# Patient Record
Sex: MALE | Race: WHITE | NOT HISPANIC OR LATINO | URBAN - METROPOLITAN AREA
[De-identification: names, ages, dates, MRNs, and addresses within clinical notes are randomized per-mention and may not be internally consistent; named-entity substitution may affect disease eponyms.]

---

## 2017-10-03 ENCOUNTER — EMERGENCY (EMERGENCY)
Facility: HOSPITAL | Age: 27
LOS: 0 days | Discharge: HOME | End: 2017-10-03
Admitting: GENERAL PRACTICE

## 2017-10-03 DIAGNOSIS — Z87.891 PERSONAL HISTORY OF NICOTINE DEPENDENCE: ICD-10-CM
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
## 2017-10-03 DIAGNOSIS — F17.200 NICOTINE DEPENDENCE, UNSPECIFIED, UNCOMPLICATED: ICD-10-CM

## 2017-10-03 DIAGNOSIS — Z04.1 ENCOUNTER FOR EXAMINATION AND OBSERVATION FOLLOWING TRANSPORT ACCIDENT: ICD-10-CM
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
## 2018-07-09 ENCOUNTER — EMERGENCY (EMERGENCY)
Facility: HOSPITAL | Age: 28
LOS: 0 days | Discharge: HOME | End: 2018-07-09
Attending: EMERGENCY MEDICINE | Admitting: EMERGENCY MEDICINE

## 2018-07-09 VITALS
SYSTOLIC BLOOD PRESSURE: 136 MMHG | RESPIRATION RATE: 20 BRPM | TEMPERATURE: 96 F | DIASTOLIC BLOOD PRESSURE: 85 MMHG | OXYGEN SATURATION: 100 % | HEART RATE: 87 BPM | WEIGHT: 149.91 LBS | HEIGHT: 68 IN

## 2018-07-09 VITALS
HEART RATE: 88 BPM | OXYGEN SATURATION: 99 % | RESPIRATION RATE: 16 BRPM | DIASTOLIC BLOOD PRESSURE: 60 MMHG | SYSTOLIC BLOOD PRESSURE: 120 MMHG

## 2018-07-09 DIAGNOSIS — R39.15 URGENCY OF URINATION: ICD-10-CM

## 2018-07-09 DIAGNOSIS — R10.9 UNSPECIFIED ABDOMINAL PAIN: ICD-10-CM

## 2018-07-09 DIAGNOSIS — Z98.890 OTHER SPECIFIED POSTPROCEDURAL STATES: Chronic | ICD-10-CM

## 2018-07-09 DIAGNOSIS — Z98.890 OTHER SPECIFIED POSTPROCEDURAL STATES: ICD-10-CM

## 2018-07-09 LAB
ALBUMIN SERPL ELPH-MCNC: 4.3 G/DL — SIGNIFICANT CHANGE UP (ref 3.5–5.2)
ALP SERPL-CCNC: 97 U/L — SIGNIFICANT CHANGE UP (ref 30–115)
ALT FLD-CCNC: 13 U/L — SIGNIFICANT CHANGE UP (ref 0–41)
ANION GAP SERPL CALC-SCNC: 15 MMOL/L — HIGH (ref 7–14)
APPEARANCE UR: CLEAR — SIGNIFICANT CHANGE UP
AST SERPL-CCNC: 15 U/L — SIGNIFICANT CHANGE UP (ref 0–41)
BASOPHILS # BLD AUTO: 0.07 K/UL — SIGNIFICANT CHANGE UP (ref 0–0.2)
BASOPHILS NFR BLD AUTO: 0.6 % — SIGNIFICANT CHANGE UP (ref 0–1)
BILIRUB SERPL-MCNC: 0.3 MG/DL — SIGNIFICANT CHANGE UP (ref 0.2–1.2)
BILIRUB UR-MCNC: NEGATIVE — SIGNIFICANT CHANGE UP
BUN SERPL-MCNC: 11 MG/DL — SIGNIFICANT CHANGE UP (ref 10–20)
CALCIUM SERPL-MCNC: 9.6 MG/DL — SIGNIFICANT CHANGE UP (ref 8.5–10.1)
CHLORIDE SERPL-SCNC: 97 MMOL/L — LOW (ref 98–110)
CO2 SERPL-SCNC: 26 MMOL/L — SIGNIFICANT CHANGE UP (ref 17–32)
COLOR SPEC: YELLOW — SIGNIFICANT CHANGE UP
CREAT SERPL-MCNC: 0.9 MG/DL — SIGNIFICANT CHANGE UP (ref 0.7–1.5)
DIFF PNL FLD: NEGATIVE — SIGNIFICANT CHANGE UP
EOSINOPHIL # BLD AUTO: 0.29 K/UL — SIGNIFICANT CHANGE UP (ref 0–0.7)
EOSINOPHIL NFR BLD AUTO: 2.4 % — SIGNIFICANT CHANGE UP (ref 0–8)
GLUCOSE SERPL-MCNC: 75 MG/DL — SIGNIFICANT CHANGE UP (ref 70–99)
GLUCOSE UR QL: NEGATIVE MG/DL — SIGNIFICANT CHANGE UP
HCT VFR BLD CALC: 41.5 % — LOW (ref 42–52)
HGB BLD-MCNC: 13.9 G/DL — LOW (ref 14–18)
IMM GRANULOCYTES NFR BLD AUTO: 0.8 % — HIGH (ref 0.1–0.3)
KETONES UR-MCNC: NEGATIVE — SIGNIFICANT CHANGE UP
LACTATE SERPL-SCNC: 2 MMOL/L — SIGNIFICANT CHANGE UP (ref 0.5–2.2)
LEUKOCYTE ESTERASE UR-ACNC: NEGATIVE — SIGNIFICANT CHANGE UP
LIDOCAIN IGE QN: 12 U/L — SIGNIFICANT CHANGE UP (ref 7–60)
LYMPHOCYTES # BLD AUTO: 3.78 K/UL — HIGH (ref 1.2–3.4)
LYMPHOCYTES # BLD AUTO: 31.4 % — SIGNIFICANT CHANGE UP (ref 20.5–51.1)
MAGNESIUM SERPL-MCNC: 2.1 MG/DL — SIGNIFICANT CHANGE UP (ref 1.8–2.4)
MCHC RBC-ENTMCNC: 29.2 PG — SIGNIFICANT CHANGE UP (ref 27–31)
MCHC RBC-ENTMCNC: 33.5 G/DL — SIGNIFICANT CHANGE UP (ref 32–37)
MCV RBC AUTO: 87.2 FL — SIGNIFICANT CHANGE UP (ref 80–94)
MONOCYTES # BLD AUTO: 0.99 K/UL — HIGH (ref 0.1–0.6)
MONOCYTES NFR BLD AUTO: 8.2 % — SIGNIFICANT CHANGE UP (ref 1.7–9.3)
NEUTROPHILS # BLD AUTO: 6.82 K/UL — HIGH (ref 1.4–6.5)
NEUTROPHILS NFR BLD AUTO: 56.6 % — SIGNIFICANT CHANGE UP (ref 42.2–75.2)
NITRITE UR-MCNC: NEGATIVE — SIGNIFICANT CHANGE UP
NRBC # BLD: 0 /100 WBCS — SIGNIFICANT CHANGE UP (ref 0–0)
PH UR: 7.5 — SIGNIFICANT CHANGE UP (ref 5–8)
PLATELET # BLD AUTO: 312 K/UL — SIGNIFICANT CHANGE UP (ref 130–400)
POTASSIUM SERPL-MCNC: 4.3 MMOL/L — SIGNIFICANT CHANGE UP (ref 3.5–5)
POTASSIUM SERPL-SCNC: 4.3 MMOL/L — SIGNIFICANT CHANGE UP (ref 3.5–5)
PROT SERPL-MCNC: 7.6 G/DL — SIGNIFICANT CHANGE UP (ref 6–8)
PROT UR-MCNC: NEGATIVE MG/DL — SIGNIFICANT CHANGE UP
RBC # BLD: 4.76 M/UL — SIGNIFICANT CHANGE UP (ref 4.7–6.1)
RBC # FLD: 12.9 % — SIGNIFICANT CHANGE UP (ref 11.5–14.5)
SODIUM SERPL-SCNC: 138 MMOL/L — SIGNIFICANT CHANGE UP (ref 135–146)
SP GR SPEC: 1.01 — SIGNIFICANT CHANGE UP (ref 1.01–1.03)
UROBILINOGEN FLD QL: 0.2 MG/DL — SIGNIFICANT CHANGE UP (ref 0.2–0.2)
WBC # BLD: 12.05 K/UL — HIGH (ref 4.8–10.8)
WBC # FLD AUTO: 12.05 K/UL — HIGH (ref 4.8–10.8)

## 2018-07-09 RX ORDER — KETOROLAC TROMETHAMINE 30 MG/ML
15 SYRINGE (ML) INJECTION ONCE
Qty: 0 | Refills: 0 | Status: DISCONTINUED | OUTPATIENT
Start: 2018-07-09 | End: 2018-07-09

## 2018-07-09 RX ORDER — MORPHINE SULFATE 50 MG/1
6 CAPSULE, EXTENDED RELEASE ORAL ONCE
Qty: 0 | Refills: 0 | Status: DISCONTINUED | OUTPATIENT
Start: 2018-07-09 | End: 2018-07-09

## 2018-07-09 RX ORDER — SODIUM CHLORIDE 9 MG/ML
1000 INJECTION INTRAMUSCULAR; INTRAVENOUS; SUBCUTANEOUS ONCE
Qty: 0 | Refills: 0 | Status: COMPLETED | OUTPATIENT
Start: 2018-07-09 | End: 2018-07-09

## 2018-07-09 RX ADMIN — SODIUM CHLORIDE 1000 MILLILITER(S): 9 INJECTION INTRAMUSCULAR; INTRAVENOUS; SUBCUTANEOUS at 10:57

## 2018-07-09 RX ADMIN — Medication 15 MILLIGRAM(S): at 10:57

## 2018-07-09 NOTE — ED PROVIDER NOTE - PHYSICAL EXAMINATION
Vital Signs: I have reviewed the initial vital signs.  Constitutional: well-nourished, no acute distress, normocephalic  Eyes: PERRLA, EOMI, no nystagmus, clear conjunctiva  ENT: MMM, TM b/l clear , no nasal congestion  Cardiovascular: regular rate, regular rhythm, no murmur appreciated  Respiratory: unlabored respiratory effort, clear to auscultation bilaterally  Gastrointestinal: soft, non-tender, non-distended  abdomen, no pulsatile mass  Genitourinary: no testicular tenderness or swelling  Musculoskeletal: supple neck, no lower extremity edema, no bony tenderness, (+) left CVAT  Integumentary: warm, dry, no rash  Neurologic: awake, alert, cranial nerves II-XII grossly intact, extremities’ motor and sensory functions grossly intact, no focal deficits, GCS 15  Psychiatric: appropriate mood, appropriate affect

## 2018-07-09 NOTE — ED PROVIDER NOTE - ATTENDING CONTRIBUTION TO CARE
I personally evaluated the patient. I reviewed the Resident’s or Physician Assistant’s note (as assigned above), and agree with the findings and plan except as documented in my note.  pt with left flank pain and dark urine this am, no f/c, no testicular pain, on exam vital signs appreciated, well apeparing, abd snt/nd no cvat, labs and studies reviewed, will d/c to f/u with pmd. Patient counseled regarding conditions which should prompt return.

## 2018-07-09 NOTE — ED PROVIDER NOTE - NS ED ROS FT
Review of Systems    Constitutional: (-) fever/ chills (-) weight loss  Eyes/ENT: (-) blurry vision, (-) epistaxis (-) sore throat (-) ear pain  Cardiovascular: (-) chest pain, (-) syncope (-) palpitations  Respiratory: (-) cough, (-) shortness of breath  Gastrointestinal: (-) vomiting, (-) diarrhea (-) abdominal pain  Genitourinary: (+) dark urine, urgency  Musculoskeletal: (-) neck pain, (+) back pain, (-) joint pain (-) pedal edema   Integumentary: (-) rash, (-) swelling  Neurological: (-) headache, (-) altered mental status  Psychiatric: (-) hallucinations or depression   Allergic/Immunologic: (-) pruritus

## 2018-07-09 NOTE — ED PROVIDER NOTE - OBJECTIVE STATEMENT
Pt comes in c/o left flank pain that began this morning around 6am and worsened around 10am. Pt states that he had urinary urgency and dark urine this morning. Pt has no h/o kidney stones. no fever, chills, nausea, vomiting. Pt comes in c/o sharp, left flank pain radiating to LLQ that began this morning around 6am and worsened around 10am. Pt states that he had urinary urgency and dark urine this morning. Pt has no h/o kidney stones. no fever, chills, nausea, vomiting. No testicular pain. No prior abdominal surgeries.

## 2018-07-10 LAB
CULTURE RESULTS: NO GROWTH — SIGNIFICANT CHANGE UP
SPECIMEN SOURCE: SIGNIFICANT CHANGE UP

## 2020-02-08 ENCOUNTER — EMERGENCY (EMERGENCY)
Facility: HOSPITAL | Age: 30
LOS: 0 days | Discharge: HOME | End: 2020-02-08
Attending: EMERGENCY MEDICINE | Admitting: EMERGENCY MEDICINE
Payer: MEDICAID

## 2020-02-08 VITALS
DIASTOLIC BLOOD PRESSURE: 66 MMHG | WEIGHT: 149.91 LBS | SYSTOLIC BLOOD PRESSURE: 147 MMHG | OXYGEN SATURATION: 100 % | HEIGHT: 68 IN | TEMPERATURE: 96 F | RESPIRATION RATE: 17 BRPM | HEART RATE: 82 BPM
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
## 2020-02-08 DIAGNOSIS — T81.31XA DISRUPTION OF EXTERNAL OPERATION (SURGICAL) WOUND, NOT ELSEWHERE CLASSIFIED, INITIAL ENCOUNTER: ICD-10-CM

## 2020-02-08 DIAGNOSIS — Z98.890 OTHER SPECIFIED POSTPROCEDURAL STATES: Chronic | ICD-10-CM

## 2020-02-08 PROCEDURE — 99283 EMERGENCY DEPT VISIT LOW MDM: CPT

## 2020-02-08 RX ORDER — CEPHALEXIN 500 MG
1 CAPSULE ORAL
Qty: 21 | Refills: 0
Start: 2020-02-08 | End: 2020-02-14

## 2020-02-08 NOTE — ED PROVIDER NOTE - NSFOLLOWUPCLINICS_GEN_ALL_ED_FT
[facility]  Surgery  [address] [place] [address]  Phone: [phone]  Fax:   Follow Up Time: 1-3 Days

## 2020-02-08 NOTE — ED PROVIDER NOTE - CLINICAL SUMMARY MEDICAL DECISION MAKING FREE TEXT BOX
Area cleaned and abx dressing placed.  WIll start po abx.  Pt understands that he will need to follow up with preforming surgeon.

## 2020-02-08 NOTE — ED PROVIDER NOTE - PATIENT PORTAL LINK FT
You can access the FollowMyHealth Patient Portal offered by [facility] by registering at the following website: [email or web address]. By joining [employer]’s FollowMyHealth portal, you will also be able to view your health information using other applications (apps) compatible with our system.

## 2020-02-08 NOTE — ED PROVIDER NOTE - ATTENDING CONTRIBUTION TO CARE
28 yo M presents with c/o opening of wound behind his ear.  pt states that he had a cyst removed about 5 days ago.  Today noticed some bleeding and realized that the wound opened up.  On exam pt in NAD AAO x 3, + dime sized open wound to posterior right ear, no bleeding, no drainage,  2 absorbable sutures seen,

## 2020-02-08 NOTE — ED PROVIDER NOTE - OBJECTIVE STATEMENT
29 year old male w no significant pmhx presents to the ED requesting evaluation of a wound to his right ear lobe. Pt states he had a cyst removed from this lobe 5 days ago by a surgeon here on [place] (unsure of the name). States 6 absorbable sutures were placed after the procedure, had been healing well until he noticed drainage from the site this afternoon. States he tried to wipe the area with a towel and noticed some of the sutures came out. Otherwise denies bleeding, drainage, fevers, ear pain. Pt states he does not have f/u with his surgeon.

## 2020-02-08 NOTE — ED PROVIDER NOTE - NS ED ROS FT
CONSTITUTIONAL: (-) fevers, (-) chills  GI: (-) nausea, (-) vomiting  SKIN: see HPI    *all other systems negative except as documented above and in the HPI*

## 2020-02-08 NOTE — ED PROVIDER NOTE - PHYSICAL EXAMINATION
VITALS:  I have reviewed the initial vital signs.  GENERAL: Well-developed, well-nourished, in no acute distress. Nontoxic.  PULM: Normal effort. No tachypnea or retractions.  MSK: Normal, steady gait.  SKIN: Warm, dry. Well healing surgical site to left inferior ear lobe with sutures in place superiorly, +1.0 cm area of dehiscence inferiorly. No active bleeding/drainage. no surrounding erythema, induration, or streaking.  NEURO: A&Ox3. Speech clear. No gross motor/sensory deficits.

## 2020-02-08 NOTE — ED ADULT NURSE NOTE - NSIMPLEMENTINTERV_GEN_ALL_ED
Implemented All Universal Safety Interventions:  [place]ie to call system. Call bell, personal items and telephone within reach. Instruct patient to call for assistance. Room bathroom lighting operational. Non-slip footwear when patient is off stretcher. Physically safe environment: no spills, clutter or unnecessary equipment. Stretcher in lowest position, wheels locked, appropriate side rails in place.

## 2020-08-30 NOTE — ED PROVIDER NOTE - CHIEF COMPLAINT
The patient is a 29y Male complaining of wound check. [Alert] : alert [No Acute Distress] : no acute distress [Normocephalic] : normocephalic [PERRL] : PERRL [Conjunctivae with no discharge] : conjunctivae with no discharge [EOMI Bilateral] : EOMI bilateral [Auricles Well Formed] : auricles well formed [Clear Tympanic membranes with present light reflex and bony landmarks] : clear tympanic membranes with present light reflex and bony landmarks [Nares Patent] : nares patent [No Discharge] : no discharge [Palate Intact] : palate intact [Pink Nasal Mucosa] : pink nasal mucosa [Nonerythematous Oropharynx] : nonerythematous oropharynx [Supple, full passive range of motion] : supple, full passive range of motion [No Palpable Masses] : no palpable masses [Symmetric Chest Rise] : symmetric chest rise [Clear to Auscultation Bilaterally] : clear to auscultation bilaterally [Regular Rate and Rhythm] : regular rate and rhythm [No Murmurs] : no murmurs [Normal S1, S2 present] : normal S1, S2 present [+2 Femoral Pulses] : +2 femoral pulses [Soft] : soft [NonTender] : non tender [Non Distended] : non distended [No Hepatomegaly] : no hepatomegaly [Normoactive Bowel Sounds] : normoactive bowel sounds [No Splenomegaly] : no splenomegaly [Guy: _____] : Guy [unfilled] [Patent] : patent [No Abnormal Lymph Nodes Palpated] : no abnormal lymph nodes palpated [No fissures] : no fissures [No Gait Asymmetry] : no gait asymmetry [Normal Muscle Tone] : normal muscle tone [No pain or deformities with palpation of bone, muscles, joints] : no pain or deformities with palpation of bone, muscles, joints [+2 Patella DTR] : +2 patella DTR [Straight] : straight [No Rash or Lesions] : no rash or lesions [Cranial Nerves Grossly Intact] : cranial nerves grossly intact [de-identified] : shoddy cervical lymphadenopathy left chain

## 2020-12-24 ENCOUNTER — EMERGENCY (EMERGENCY)
Facility: HOSPITAL | Age: 30
LOS: 0 days | Discharge: HOME | End: 2020-12-24
Attending: EMERGENCY MEDICINE | Admitting: EMERGENCY MEDICINE
Payer: MEDICAID

## 2020-12-24 VITALS
RESPIRATION RATE: 20 BRPM | OXYGEN SATURATION: 95 % | SYSTOLIC BLOOD PRESSURE: 128 MMHG | WEIGHT: 149.91 LBS | HEART RATE: 90 BPM | TEMPERATURE: 100 F | DIASTOLIC BLOOD PRESSURE: 80 MMHG | HEIGHT: 68 IN

## 2020-12-24 VITALS
OXYGEN SATURATION: 98 % | SYSTOLIC BLOOD PRESSURE: 114 MMHG | HEART RATE: 77 BPM | RESPIRATION RATE: 16 BRPM | TEMPERATURE: 99 F | DIASTOLIC BLOOD PRESSURE: 56 MMHG

## 2020-12-24 DIAGNOSIS — Z79.2 LONG TERM (CURRENT) USE OF ANTIBIOTICS: ICD-10-CM

## 2020-12-24 DIAGNOSIS — F11.10 OPIOID ABUSE, UNCOMPLICATED: ICD-10-CM

## 2020-12-24 DIAGNOSIS — F17.200 NICOTINE DEPENDENCE, UNSPECIFIED, UNCOMPLICATED: ICD-10-CM

## 2020-12-24 DIAGNOSIS — Z98.890 OTHER SPECIFIED POSTPROCEDURAL STATES: Chronic | ICD-10-CM

## 2020-12-24 PROCEDURE — 99284 EMERGENCY DEPT VISIT MOD MDM: CPT

## 2020-12-24 NOTE — ED PROVIDER NOTE - PROGRESS NOTE DETAILS
ARLET: Patient alert, awake, walking around department, requesting dc home. Patient agreeable and verbalizes understanding of plan of care, f/u, and return precautions.

## 2020-12-24 NOTE — ED PROVIDER NOTE - OBJECTIVE STATEMENT
30 year old male w hx of heroin abuse presents to the ED via EMS from home for evaluation of possible OD. Pt was found by family face down with syringes around him, rolled him over and his was sleepy but arouseable. No Narcan given at home or by EMS. Pt admits to IV heroin use today, denies other drug use, SI/HI, V/A hallucinations, cp, shortness of breath, n/v, recent illness.

## 2020-12-24 NOTE — ED PROVIDER NOTE - NSFOLLOWUPCLINICS_GEN_ALL_ED_FT
[facility]  Detox Mgmt  [address] [place] [address]  Phone: [phone]  Fax:   Follow Up Time: 1-3 Days

## 2020-12-24 NOTE — ED PROVIDER NOTE - ATTENDING CONTRIBUTION TO CARE
30 year old male, pmhx as documented, presenting s/p overdose on heroin. Patient was found face down at home with syringes around him. Patient was arousable without narcan administration. On arrival to ED patient admits to IV heroin use today but denies SI/HI/hallucinations or other drug use. Denies any active complaints at this time.    Vital Signs: I have reviewed the initial vital signs.  Constitutional: NAD, well-nourished, appears stated age, no acute distress.  HEENT: Airway patent, moist MM, no erythema/swelling/deformity of oral structures. EOMI, PERRLA.  CV: regular rate, regular rhythm, well-perfused extremities, 2+ b/l DP and radial pulses equal.  Lungs: BCTA, no increased WOB.  ABD: NTND, no guarding or rebound, no pulsatile mass, no hernias.   MSK: Neck supple, nontender, nl ROM, no stepoff. Chest nontender. Back nontender in TLS spine or to b/l bony structures or flanks. Ext nontender, nl rom, no deformity.   INTEG: Skin warm, dry, no rash.  NEURO: A&Ox3, normal strength, nl sensation throughout, normal speech.   PSYCH: Calm, cooperative, normal affect and interaction.    FS WNL. No evidence of trauma, no SI/HI/hallucinations. Will monitor in ED, re-eval.

## 2020-12-24 NOTE — ED PROVIDER NOTE - NSFOLLOWUPINSTRUCTIONS_ED_ALL_ED_FT
Opioid Use Disorder    Opioid use disorder is a mental disorder. It is the continued nonmedical use of opioids in spite of risks to health and well-being. Misused opioids include the street drug heroin. They also include pain medicines such as morphine, hydrocodone, oxycodone, and fentanyl. Opioids are very addictive. People who misuse opioids get an exaggerated feeling of well-being. Opioid use disorder often disrupts activities at home, work, or school. It may cause mental or physical problems.     A family history of opioid use disorder puts you at higher risk of it. People with opioid use disorder often misuse other drugs or have mental illness such as depression, posttraumatic stress disorder, or antisocial personality disorder. They also are at risk of suicide and death from overdose.    SIGNS AND SYMPTOMS  Signs and symptoms of opioid use disorder include:    Use of opioids in larger amounts or over a longer period than intended.   Unsuccessful attempts to cut down or control opioid use.   A lot of time spent obtaining, using, or recovering from the effects of opioids.   A strong desire or urge to use opioids (craving).   Continued use of opioids in spite of major problems at work, school, or home because of use.   Continued use of opioids in spite of relationship problems because of use.   Giving up or cutting down on important life activities because of opioid use.  Use of opioids over and over in situations when it is physically hazardous, such as driving a car.   Continued use of opioids in spite of a physical problem that is likely related to use. Physical problems can include:   Severe constipation.  Poor nutrition.  Infertility.  Tuberculosis.  Aspiration pneumonia.  Infections such as human immunodeficiency virus (HIV) and hepatitis (from injecting opioids).   Continued use of opioids in spite of a mental problem that is likely related to use. Mental problems can include:  Depression.  Anxiety.  Hallucinations.  Sleep problems.  Loss of sexual function.  Need to use more and more opioids to get the same effect, or lessened effect over time with use of the same amount (tolerance).   Having withdrawal symptoms when opioid use is stopped, or using opioids to reduce or avoid withdrawal symptoms. Withdrawal symptoms include:   Depressed, anxious, or irritable mood.   Nausea, vomiting, diarrhea, or intestinal cramping.   Muscle aches or spasms.   Excessive tearing or runny nose.   Dilated pupils, sweating, or hairs standing on end.  Yawning.  Fever, raised blood pressure, or fast pulse.   Restlessness or trouble sleeping. This does not apply to people taking opioids for medical reasons only.    DIAGNOSIS  Opioid use disorder is diagnosed by your health care provider. You may be asked questions about your opioid use and and how it affects your life. A physical exam may be done. A drug screen may be ordered. You may be referred to a mental health professional. The diagnosis of opioid use disorder requires at least two symptoms within 12 months. The type of opioid use disorder you have depends on the number of signs and symptoms you have. The type may be:    Mild. Two or three signs and symptoms.     Moderate. Four or five signs and symptoms.    Severe. Six or more signs and symptoms.     TREATMENT  Treatment is usually provided by mental health professionals with training in substance use disorders. The following options are available:    Detoxification. This is the first step in treatment for withdrawal. It is medically supervised withdrawal with the use of medicines. These medicines lessen withdrawal symptoms. They also raise the chance of becoming opioid free.   Counseling, also known as talk therapy. Talk therapy addresses the reasons you use opioids. It also addresses ways to keep you from using again (relapse). The goals of talk therapy are to avoid relapse by:   Identifying and avoiding triggers for use.  Finding healthy ways to cope with stress.  Learning how to handle cravings.  Support groups. Support groups provide emotional support, advice, and guidance.  A medicine that blocks opioid receptors in your brain. This medicine can reduce opioid cravings that lead to relapse. This medicine also blocks the desired opioid effect when relapse occurs.  Opioids that are taken by mouth in place of the misused opioid (opioid maintenance treatment). These medicines satisfy cravings but are safer than commonly misused opioids. This often is the best option for people who continue to relapse with other treatments.    HOME CARE INSTRUCTIONS  Take medicines only as directed by your health care provider.   Check with your health care provider before starting new medicines.  Keep all follow-up visits as directed by your health care provider.    SEEK MEDICAL CARE IF:  You are not able to take your medicines as directed.  Your symptoms get worse.    SEEK IMMEDIATE MEDICAL CARE IF:  You have serious thoughts about hurting yourself or others.  You may have taken an overdose of opioids.    FOR MORE INFORMATION  National [place] on Drug Abuse: www.drugabuse.gov  Substance Abuse and Mental Health Services Administration: www.samhsa.gov    ADDITIONAL NOTES AND INSTRUCTIONS    Please follow up with your Primary MD in 24-48 hr.  Seek immediate medical care for any new/worsening signs or symptoms.

## 2020-12-24 NOTE — ED ADULT TRIAGE NOTE - CHIEF COMPLAINT QUOTE
BIBA from home as per EMS brother had called was found on the floor unresponsive, was rolled over and became responsive. EMS found empty sypringes at the house. Pt denies using. BIBA from home as per EMS brother had called was found on the floor unresponsive, was rolled over and became responsive. EMS found empty syringes at the house. Pt denies using. No Narcan was used.

## 2020-12-24 NOTE — ED ADULT NURSE NOTE - NSIMPLEMENTINTERV_GEN_ALL_ED
Implemented All Fall with Harm Risk Interventions:  [place]ris to call system. Call bell, personal items and telephone within reach. Instruct patient to call for assistance. Room bathroom lighting operational. Non-slip footwear when patient is off stretcher. Physically safe environment: no spills, clutter or unnecessary equipment. Stretcher in lowest position, wheels locked, appropriate side rails in place. Provide visual cue, wrist band, yellow gown, etc. Monitor gait and stability. Monitor for mental status changes and reorient to person, place, and time. Review medications for side effects contributing to fall risk. Reinforce activity limits and safety measures with patient and family. Provide visual clues: red socks.

## 2020-12-24 NOTE — ED PROVIDER NOTE - NS ED ROS FT
CONSTITUTIONAL: (-) fevers, (-) chills  EYES: (-) vision changes, (-) blurry vision  ENT: (-) congestion, (-) rhinorrhea, (-) sore throat  NECK: (-) neck pain, (-) neck stiffness  CARDIO: (-) chest pain, (-) palpitations  PULM: (-) cough, (-) sputum, (-) chest tightness, (-) shortness of breath  GI: (-) nausea, (-) vomiting, (-) abdominal pain  MSK: (-) gait difficulty, (-) joint pain, (-) deformity, (-) joint swelling  SKIN: (-) rashes, (-) pallor, (-) itching, (-) wounds, (-) ecchymosis, (-) jaundice  NEURO: (-) headache, (-) head injury, (-) LOC, (-) dizziness, (-) lightheadedness, (-) weakness  PSYCH: see HPI, (-) suicidal ideation, (-) homicidal ideation, (-) depression, (-) anxiety, (-) visual hallucinations, (-) auditory hallucinations, (-) agitation    *all other systems negative except as documented above and in the HPI*

## 2020-12-24 NOTE — ED ADULT NURSE NOTE - CHIEF COMPLAINT QUOTE
BIBA from home as per EMS brother had called was found on the floor unresponsive, was rolled over and became responsive. EMS found empty sypringes at the house. Pt denies using. BIBA from home as per EMS brother had called was found on the floor unresponsive, was rolled over and became responsive. EMS found empty syringes at the house. Pt denies using. No Narcan was given.

## 2020-12-24 NOTE — ED PROVIDER NOTE - CLINICAL SUMMARY MEDICAL DECISION MAKING FREE TEXT BOX
Patient presented s/p unintentional heroin overdose. Otherwise afebrile, HD stable, neurovascularly intact, no SI/HI/hallucinations, no narcan received in the field. On arrival patient confirms use of heroin and denies any other complaints. FS WNL. Observed in ED after which time patient ambulatory without difficulty, tolerating PO, wants to go home. Will discharge. Patient agreeable with plan. Agrees to return to ED for any new or worsening symptoms.

## 2020-12-24 NOTE — ED PROVIDER NOTE - PATIENT PORTAL LINK FT
You can access the FollowMyHealth Patient Portal offered by [facility] by registering at the following website: [email or web address]. By joining [employer]’s FollowMyHealth portal, you will also be able to view your health information using other applications (apps) compatible with our system.

## 2020-12-24 NOTE — ED PROVIDER NOTE - PHYSICAL EXAMINATION
VITALS:  I have reviewed the initial vital signs.  GENERAL: Well-developed, well-nourished, in no acute distress. Nontoxic.  HEENT: NC/AT. Sclera clear. No conjunctival pallor. EOMI, PERRLA. MMM.   NECK: supple w FROM.  CARDIO: RRR, nl S1 and S2. No murmurs, rubs, or gallops.  PULM: Normal effort. No bradypnea. CTA b/l without wheezes, rales, or rhonchi.  MSK: FROM to extremities x4.   GI: Abdomen soft and non-distended. Nontender.  SKIN: Warm, dry. track marks to b/l upper extremities.   NEURO: Sleeping, but wakes easily to voice. A&Ox3 when awake. Speech clear. No focal deficits.  PSYCH: Calm and cooperative.